# Patient Record
Sex: FEMALE | Race: WHITE | NOT HISPANIC OR LATINO | Employment: FULL TIME | URBAN - NONMETROPOLITAN AREA
[De-identification: names, ages, dates, MRNs, and addresses within clinical notes are randomized per-mention and may not be internally consistent; named-entity substitution may affect disease eponyms.]

---
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
## 2019-08-19 ENCOUNTER — OFFICE VISIT (OUTPATIENT)
Dept: URGENT CARE | Facility: HOSPITAL | Age: 19
End: 2019-08-19
Payer: COMMERCIAL

## 2019-08-19 VITALS
TEMPERATURE: 96.2 F | BODY MASS INDEX: 39.02 KG/M2 | HEART RATE: 81 BPM | OXYGEN SATURATION: 98 % | RESPIRATION RATE: 18 BRPM | WEIGHT: 234.2 LBS | HEIGHT: 65 IN | DIASTOLIC BLOOD PRESSURE: 80 MMHG | SYSTOLIC BLOOD PRESSURE: 132 MMHG

## 2019-08-19 DIAGNOSIS — K04.7 DENTAL INFECTION: Primary | ICD-10-CM

## 2019-08-19 PROCEDURE — 99283 EMERGENCY DEPT VISIT LOW MDM: CPT | Performed by: NURSE PRACTITIONER

## 2019-08-19 PROCEDURE — G0382 LEV 3 HOSP TYPE B ED VISIT: HCPCS | Performed by: NURSE PRACTITIONER

## 2019-08-19 PROCEDURE — 99203 OFFICE O/P NEW LOW 30 MIN: CPT | Performed by: NURSE PRACTITIONER

## 2019-08-19 RX ORDER — NAPROXEN 500 MG/1
500 TABLET ORAL 2 TIMES DAILY WITH MEALS
Qty: 14 TABLET | Refills: 0 | Status: SHIPPED | OUTPATIENT
Start: 2019-08-19 | End: 2021-09-16

## 2019-08-19 RX ORDER — CLINDAMYCIN HYDROCHLORIDE 300 MG/1
300 CAPSULE ORAL 3 TIMES DAILY
Qty: 21 CAPSULE | Refills: 0 | Status: SHIPPED | OUTPATIENT
Start: 2019-08-19 | End: 2019-08-26

## 2019-08-19 NOTE — LETTER
August 19, 2019     Patient: Saad Vásquez   YOB: 2000   Date of Visit: 8/19/2019       To Whom It May Concern: It is my medical opinion that Saad Vásquez may return to work on 08/20/2019  If you have any questions or concerns, please don't hesitate to call           Sincerely,        RACHID Pope    CC: No Recipients

## 2019-08-19 NOTE — PATIENT INSTRUCTIONS
Start antibiotic  Take probiotic  Start naproxen as prescribed  Salt water gargles  Follow up with dentist for evaluation as there is a broken molar  Ice every 3-4 hours for 20 minutes  Number given for dental clinic: [phone]  Go to ER with worsening symptoms

## 2019-08-19 NOTE — PROGRESS NOTES
[facility]        NAME: Herber Patel is a 20 y o  female  : 2000    MRN: [id number]  DATE: 2019  TIME: 9:15 AM    Assessment and Plan   Dental infection [K04 7]  1  Dental infection  clindamycin (CLEOCIN) 300 MG capsule    naproxen (NAPROSYN) 500 mg tablet         Patient Instructions     Patient Instructions   Start antibiotic  Take probiotic  Start naproxen as prescribed  Salt water gargles  Follow up with dentist for evaluation as there is a broken molar  Ice every 3-4 hours for 20 minutes  Number given for dental clinic: [phone]  Go to ER with worsening symptoms  Chief Complaint     Chief Complaint   Patient presents with    Dental Pain     ongoing pain          History of Present Illness   Herber Patel presents to the clinic c/o    This is a 23-year-old female here today with complaints dental pain  She states she fell about 2-3 years  He states over the last 2 days she has noticed increased pain over the right lower molar  Tooth is broken  No fevers at this time  She has some swelling over site and has been using ice  She states the past she has used ice and pain has resolved  She has not been able to see a dentist       Review of Systems   Review of Systems   Constitutional: Negative  HENT: Positive for dental problem  Respiratory: Negative  Cardiovascular: Negative  Neurological: Negative            Current Medications     Long-Term Medications   Medication Sig Dispense Refill    ibuprofen (MOTRIN) 400 mg tablet Take 1 tablet by mouth every 6 (six) hours as needed for mild pain 15 tablet 0    naproxen (NAPROSYN) 500 mg tablet Take 1 tablet (500 mg total) by mouth 2 (two) times a day with meals for 7 days 14 tablet 0    ondansetron (ZOFRAN-ODT) 4 mg disintegrating tablet Take 1 tablet (4 mg total) by mouth every 8 (eight) hours as needed for nausea or vomiting for up to 20 doses (Patient not taking: Reported on 2019) 20 tablet 0  tamsulosin (FLOMAX) 0 4 mg Take 1 capsule (0 4 mg total) by mouth daily with dinner for 14 doses 14 capsule 0       Current Allergies     Allergies as of 08/19/2019 - Reviewed 08/19/2019   Allergen Reaction Noted    Augmentin [amoxicillin-pot clavulanate] GI Intolerance 11/05/2016            The following portions of the patient's history were reviewed and updated as appropriate: allergies, current medications, past family history, past medical history, past social history, past surgical history and problem list     Objective   /80 (BP Location: Left arm, Patient Position: Sitting, Cuff Size: Adult)   Pulse 81   Temp (!) 96 2 °F (35 7 °C) (Tympanic)   Resp 18   Ht 5' 5" (1 651 m)   Wt 106 kg (234 lb 3 2 oz)   SpO2 98%   BMI 38 97 kg/m²        Physical Exam     Physical Exam   Constitutional: She appears well-developed and well-nourished  HENT:   Mouth/Throat:       Cardiovascular: Normal rate and regular rhythm  Pulmonary/Chest: Effort normal and breath sounds normal    Nursing note and vitals reviewed

## 2021-09-16 ENCOUNTER — OFFICE VISIT (OUTPATIENT)
Dept: FAMILY MEDICINE CLINIC | Facility: CLINIC | Age: 21
End: 2021-09-16
Payer: COMMERCIAL

## 2021-09-16 VITALS
WEIGHT: 216 LBS | TEMPERATURE: 99.2 F | DIASTOLIC BLOOD PRESSURE: 94 MMHG | BODY MASS INDEX: 34.72 KG/M2 | HEIGHT: 66 IN | SYSTOLIC BLOOD PRESSURE: 154 MMHG | OXYGEN SATURATION: 98 % | HEART RATE: 100 BPM

## 2021-09-16 DIAGNOSIS — Z30.09 BIRTH CONTROL COUNSELING: ICD-10-CM

## 2021-09-16 DIAGNOSIS — Z76.89 ENCOUNTER TO ESTABLISH CARE: Primary | ICD-10-CM

## 2021-09-16 DIAGNOSIS — F33.1 MODERATE EPISODE OF RECURRENT MAJOR DEPRESSIVE DISORDER (HCC): ICD-10-CM

## 2021-09-16 DIAGNOSIS — F41.1 GAD (GENERALIZED ANXIETY DISORDER): ICD-10-CM

## 2021-09-16 DIAGNOSIS — R45.851 SUICIDAL IDEATIONS: ICD-10-CM

## 2021-09-16 DIAGNOSIS — Z13.21 ENCOUNTER FOR VITAMIN DEFICIENCY SCREENING: ICD-10-CM

## 2021-09-16 DIAGNOSIS — Z12.4 SCREENING FOR CERVICAL CANCER: ICD-10-CM

## 2021-09-16 DIAGNOSIS — Z13.220 LIPID SCREENING: ICD-10-CM

## 2021-09-16 DIAGNOSIS — Z86.59 HISTORY OF BIPOLAR DISORDER: ICD-10-CM

## 2021-09-16 PROCEDURE — 99204 OFFICE O/P NEW MOD 45 MIN: CPT | Performed by: PHYSICIAN ASSISTANT

## 2021-09-16 RX ORDER — FLUOXETINE 10 MG/1
10 CAPSULE ORAL DAILY
Qty: 30 CAPSULE | Refills: 0 | Status: SHIPPED | OUTPATIENT
Start: 2021-09-16 | End: 2021-10-29 | Stop reason: SDUPTHER

## 2021-09-16 NOTE — PROGRESS NOTES
New Patient    Bhavik Hidalgo 24 y o  female   Date:  9/16/2021      Assessment and Plan:    Ministerio Stearns was seen today for establish care and depression  Diagnoses and all orders for this visit:    Encounter to establish care    Screening for cervical cancer  -     Ambulatory referral to Obstetrics / Gynecology; Future    Moderate episode of recurrent major depressive disorder (HCC)  -     FLUoxetine (PROzac) 10 mg capsule; Take 1 capsule (10 mg total) by mouth daily  -     CBC and differential; Future  -     Comprehensive metabolic panel; Future  -     TSH, 3rd generation with Free T4 reflex; Future  -     Vitamin D 25 hydroxy; Future  -     Ambulatory referral to Psychiatry; Future  -     Ambulatory referral to Psychology; Future    MALENA (generalized anxiety disorder)  -     FLUoxetine (PROzac) 10 mg capsule; Take 1 capsule (10 mg total) by mouth daily  -     Ambulatory referral to Psychiatry; Future  -     Ambulatory referral to Psychology; Future    Birth control counseling  -     Ambulatory referral to Obstetrics / Gynecology; Future    Encounter for vitamin deficiency screening  -     Vitamin D 25 hydroxy; Future    Lipid screening  -     Lipid panel; Future    Suicidal ideations  -     Ambulatory referral to Psychiatry; Future  -     Ambulatory referral to Psychology; Future    History of bipolar disorder  -     Ambulatory referral to Psychiatry; Future  -     Ambulatory referral to Psychology; Future          BMI Counseling: Body mass index is 34 86 kg/m²  The BMI is above normal  Nutrition recommendations include decreasing portion sizes  Exercise recommendations include exercising 3-5 times per week  Rationale for BMI follow-up plan is due to patient being overweight or obese  Depression Screening and Follow-up Plan:   Patient's depression screening was positive with a PHQ-2 score of 5  Their PHQ-9 score was 24  Patient assessed for underlying major depression   Brief counseling provided and recommend additional follow-up/re-evaluation next office visit  Patient denies any active plan or intention of self harm or suicide  She declines ER eval for crisis eval or hospitalization  She declines partial program due to job  She feels safe going home  Her boyfriend is moving in with her next week and admits that he is her best support, very thankful for him  I have spent >45 minutes with Patient  today in which greater than 50% of this time was spent in counseling/coordination of care regarding Prognosis, Risks and benefits of tx options, Intructions for management, Patient and family education, Importance of tx compliance, Risk factor reductions and Impressions  HPI:  Chief Complaint   Patient presents with   [address] pt, pt wants to discuss birth control    Depression     Positive depression screen, pt wants to discuss meds for depression     HPI   Patient is a 25 yo female who presents for new patient appt  Her main concern is her active depression and anxiety  She was started medication in high school for the same  She was on zoloft in the past  She recalls since making depression worse and gave her suicidal ideations  She was also taking medications for ADHD, combined  She was on concerta and ritalin  They worked for focused  She was diagnosed with ADHD in 6-7  She is figdty  She has a hard focusing at work and at home  She has not been on anything since sophomore year  She has suicidal ideations but denies active plan or intention  She has boyfriend whom she has a lot of support  He is moving in with her next week and is really looking forward to this  However she is stressed to get things cleaned up for the move  She has a hard time in social setting, making new friends, she feels she is in the way at the grocery stores  She would like to consider starting birth control  ROS: Review of Systems   Constitutional: Negative  Respiratory: Negative      Cardiovascular: Negative  Gastrointestinal: Negative  Genitourinary: Negative  Neurological: Negative  Psychiatric/Behavioral: Positive for decreased concentration, dysphoric mood and suicidal ideas  Negative for agitation and behavioral problems  The patient is nervous/anxious  Past Medical History:   Diagnosis Date    Asthma      Patient Active Problem List   Diagnosis    Asthma    Obesity (BMI 30-39  9)       Past Surgical History:   Procedure Laterality Date    MYRINGOTOMY         Social History     Socioeconomic History    Marital status: Single     Spouse name: None    Number of children: None    Years of education: None    Highest education level: None   Occupational History    None   Tobacco Use    Smoking status: Former Smoker     Packs/day: 0 25     Types: Cigarettes    Smokeless tobacco: Never Used   Vaping Use    Vaping Use: Every day    Substances: Nicotine, Flavoring   Substance and Sexual Activity    Alcohol use: Yes     Comment: occasional    Drug use: No    Sexual activity: None   Other Topics Concern    None   Social History Narrative    None     Social Determinants of Health     Financial Resource Strain:     Difficulty of Paying Living Expenses:    Food Insecurity:     Worried About Running Out of Food in the Last Year:     Ran Out of Food in the Last Year:    Transportation Needs:     Lack of Transportation (Medical):      Lack of Transportation (Non-Medical):    Physical Activity:     Days of Exercise per Week:     Minutes of Exercise per Session:    Stress:     Feeling of Stress :    Social Connections:     Frequency of Communication with Friends and Family:     Frequency of Social Gatherings with Friends and Family:     Attends [employer] Services:     Active Member of Clubs or Organizations:     Attends Club or Organization Meetings:     Marital Status:    Intimate Partner Violence:     Fear of Current or Ex-Partner:     Emotionally Abused:     Physically Abused:     Sexually Abused:        History reviewed  No pertinent family history  Allergies   Allergen Reactions    Augmentin [Amoxicillin-Pot Clavulanate] GI Intolerance         Current Outpatient Medications:     FLUoxetine (PROzac) 10 mg capsule, Take 1 capsule (10 mg total) by mouth daily, Disp: 30 capsule, Rfl: 0    PHQ-9 Depression Screening    PHQ-9:   Frequency of the following problems over the past two weeks:      Little interest or pleasure in doing things: 3 - nearly every day  Feeling down, depressed, or hopeless: 2 - more than half the days  Trouble falling or staying asleep, or sleeping too much: 3 - nearly every day  Feeling tired or having little energy: 3 - nearly every day  Poor appetite or overeating: 3 - nearly every day  Feeling bad about yourself - or that you are a failure or have let yourself or your family down: 2 - more than half the days  Trouble concentrating on things, such as reading the newspaper or watching television: 3 - nearly every day  Moving or speaking so slowly that other people could have noticed  Or the opposite - being so fidgety or restless that you have been moving around a lot more than usual: 2 - more than half the days  Thoughts that you would be better off dead, or of hurting yourself in some way: 3 - nearly every day  PHQ-2 Score: 5  PHQ-9 Score: 24         Physical Exam:  /94 (BP Location: Left arm, Patient Position: Sitting, Cuff Size: Adult)   Pulse 100   Temp 99 2 °F (37 3 °C) (Tympanic)   Ht 5' 6" (1 676 m)   Wt 98 kg (216 lb)   SpO2 98%   BMI 34 86 kg/m²     Physical Exam  Constitutional:       General: She is not in acute distress  Appearance: Normal appearance  She is obese  HENT:      Head: Normocephalic and atraumatic  Right Ear: External ear normal       Left Ear: External ear normal       Mouth/Throat:      Mouth: Mucous membranes are moist       Pharynx: Oropharynx is clear     Eyes:      Conjunctiva/sclera: Conjunctivae normal       Pupils: Pupils are equal, round, and reactive to light  Cardiovascular:      Rate and Rhythm: Normal rate and regular rhythm  Heart sounds: No murmur heard  Pulmonary:      Effort: Pulmonary effort is normal  No respiratory distress  Breath sounds: Normal breath sounds  No wheezing  Musculoskeletal:         General: No deformity  Skin:     General: Skin is warm and dry  Coloration: Skin is not pale  Neurological:      General: No focal deficit present  Mental Status: She is alert and oriented to person, place, and time  Cranial Nerves: No cranial nerve deficit  Psychiatric:         Attention and Perception: Attention normal          Mood and Affect: Affect normal  Mood is anxious  Speech: Speech normal          Behavior: Behavior normal  Behavior is cooperative  Thought Content: Thought content includes suicidal (passive thoughts) ideation  Thought content does not include suicidal plan           Judgment: Judgment normal            Labs:  Lab Results   Component Value Date    WBC 13 05 (H) 04/05/2018    HGB 12 5 04/05/2018    HCT 37 0 04/05/2018    MCV 82 04/05/2018     04/05/2018     Lab Results   Component Value Date    K 4 0 04/05/2018     04/05/2018    CO2 23 04/05/2018    BUN 13 04/05/2018    CREATININE 1 02 04/05/2018    CALCIUM 8 6 04/05/2018    AST 30 04/05/2018    ALT 30 04/05/2018    ALKPHOS 57 04/05/2018    EGFR 80 04/05/2018

## 2021-09-22 ENCOUNTER — TELEPHONE (OUTPATIENT)
Dept: PSYCHIATRY | Facility: CLINIC | Age: 21
End: 2021-09-22

## 2021-10-29 ENCOUNTER — OFFICE VISIT (OUTPATIENT)
Dept: FAMILY MEDICINE CLINIC | Facility: CLINIC | Age: 21
End: 2021-10-29
Payer: COMMERCIAL

## 2021-10-29 VITALS
OXYGEN SATURATION: 99 % | DIASTOLIC BLOOD PRESSURE: 96 MMHG | HEART RATE: 73 BPM | SYSTOLIC BLOOD PRESSURE: 138 MMHG | HEIGHT: 66 IN | TEMPERATURE: 97.3 F | BODY MASS INDEX: 34.87 KG/M2 | WEIGHT: 217 LBS

## 2021-10-29 DIAGNOSIS — R03.0 ELEVATED BLOOD PRESSURE READING IN OFFICE WITHOUT DIAGNOSIS OF HYPERTENSION: ICD-10-CM

## 2021-10-29 DIAGNOSIS — F33.1 MODERATE EPISODE OF RECURRENT MAJOR DEPRESSIVE DISORDER (HCC): Primary | ICD-10-CM

## 2021-10-29 DIAGNOSIS — F31.70 HISTORY OF DEPRESSED BIPOLAR DISORDER (HCC): ICD-10-CM

## 2021-10-29 DIAGNOSIS — F41.1 GAD (GENERALIZED ANXIETY DISORDER): ICD-10-CM

## 2021-10-29 PROCEDURE — 99214 OFFICE O/P EST MOD 30 MIN: CPT | Performed by: FAMILY MEDICINE

## 2021-10-29 RX ORDER — FLUOXETINE 10 MG/1
10 CAPSULE ORAL DAILY
Qty: 30 CAPSULE | Refills: 1 | Status: SHIPPED | OUTPATIENT
Start: 2021-10-29

## 2021-10-31 PROBLEM — R03.0 ELEVATED BLOOD PRESSURE READING IN OFFICE WITHOUT DIAGNOSIS OF HYPERTENSION: Status: ACTIVE | Noted: 2021-10-31

## 2021-10-31 PROBLEM — F31.70 HISTORY OF DEPRESSED BIPOLAR DISORDER (HCC): Status: ACTIVE | Noted: 2021-10-31

## 2023-07-27 ENCOUNTER — VBI (OUTPATIENT)
Dept: ADMINISTRATIVE | Facility: OTHER | Age: 23
End: 2023-07-27